# Patient Record
Sex: MALE | Race: OTHER | NOT HISPANIC OR LATINO
[De-identification: names, ages, dates, MRNs, and addresses within clinical notes are randomized per-mention and may not be internally consistent; named-entity substitution may affect disease eponyms.]

---

## 2018-11-19 PROBLEM — Z00.00 ENCOUNTER FOR PREVENTIVE HEALTH EXAMINATION: Status: ACTIVE | Noted: 2018-11-19

## 2019-01-09 ENCOUNTER — APPOINTMENT (OUTPATIENT)
Dept: NEUROLOGY | Facility: CLINIC | Age: 79
End: 2019-01-09
Payer: MEDICARE

## 2019-01-09 DIAGNOSIS — I63.512 CEREBRAL INFARCTION DUE TO UNSPECIFIED OCCLUSION OR STENOSIS OF LEFT MIDDLE CEREBRAL ARTERY: ICD-10-CM

## 2019-01-09 DIAGNOSIS — I69.351 HEMIPLEGIA AND HEMIPARESIS FOLLOWING CEREBRAL INFARCTION AFFECTING RIGHT DOMINANT SIDE: ICD-10-CM

## 2019-01-09 PROCEDURE — 99213 OFFICE O/P EST LOW 20 MIN: CPT

## 2019-01-09 NOTE — PHYSICAL EXAM
[FreeTextEntry1] : Mental status\par Awake, alert, attentive.  \par When I ask him how he feels he says "with my hands" - this is consistent with his usual humor. He does not answer any other questions. He follows simple commands inconsistently.\par Fixes and follows.\par \par Motor\par Right arm - only movement in the fingers. The rest of the arm is 0/5.\par \par Increased tone in the left arm and B legs.  \par Moving his left arm very well.  He moves his legs to command.  ?moving left > right.\par

## 2019-01-09 NOTE — ASSESSMENT
[FreeTextEntry1] : Mr. De La Vega is a 79 yo man with recent stroke – etiology cardioembolic.  He was switched from anticoagulation to Plavix because of bleeding history.   He is already on a statin and antihypertensive medications for stroke risk reduction. He has a history of hydrocephalus with a  shunt. There is no neurological contraindication for neurosurgical evaluation of the  shunt function.

## 2019-01-09 NOTE — HISTORY OF PRESENT ILLNESS
[FreeTextEntry1] : Mr. De La Vega is a 78-year-old man who was admitted to [facility] on December 4, 2018 after being found to have sudden onset of right arm paralysis.   The MRI brain showed multiple small areas of infarction in the left MCA territory.  He was found to have atrial fibrillation and was placed on Eliquis initially.  Later this was stopped and he was switched to Plavix – hospital notes “Review of records indicates pt with recurrent hematuria, prior hematemesis. Current event was off antiplt rx.  In view of above and underlying profound OMS and functional impairment, I believe best course of therapy is Plavix alone. Defer ASA and full anticoag”\par Currently he has only slight movement in the fingers. He is on Plavix, Lipitor and antihypertensive medications. No further events neurologically since being discharged from the hospital. He is in [facility].\par \par Please see scanned document for PFSH, medications, allergies and review of systems.\par \par

## 2019-01-09 NOTE — DATA REVIEWED
[de-identified] : During admission to [facility] 12/2018\par MRI brain – “there are foci of high signal intensity on diffusion imaging in the cortex of the left frontal lobe as well as in the subcortical white matter of the left frontal lobe, left parietal lobe and the left periventricular white matter. Foci demonstrate low signal on the ADC map. These findings indicate restricted diffusion typical of recent infarction. 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 The left vertebral artery is larger than the right vertebral artery, a finding consistent with normal variation. The right vertebral artery predominantly ends in PICA, a variation of normal. I believe that the area of signal dropout reported as a severe stenosis on the preliminary report is related to expected signal dropout as the small vessel curves horizontally. There is a similar, but less marked, finding on the left side.\par \par There are multiple areas of signal dropout in intracranial branches most prominent in the right posterior cerebral artery. These cannot be adequately evaluated on a motion degraded study.\par  [de-identified] : Exam 12/2018 when admitted to [facility]\par \par Pertinent findings on initial neurological examination:\par Awake, alert, attentive.  No verbal output.  Follows commands including "show me two fingers" with left hand. \par Fixes and follows.\par Flaccid in the right arm. \par Increased tone in the left arm and legs.  \par Moving his left arm very well.  He moves his legs - withdrawals to stimulation and lifts his legs up to command - ?moving left > right.\par Diffuse hyperreflexia except for right knee 1+ and ankles 0.\par Plantars right: upgoing  \par Plantars left: upgoing  \par